# Patient Record
Sex: FEMALE | Race: WHITE | NOT HISPANIC OR LATINO | Employment: OTHER | URBAN - METROPOLITAN AREA
[De-identification: names, ages, dates, MRNs, and addresses within clinical notes are randomized per-mention and may not be internally consistent; named-entity substitution may affect disease eponyms.]

---

## 2019-08-15 ENCOUNTER — OFFICE VISIT (OUTPATIENT)
Dept: ORTHOPEDICS | Facility: CLINIC | Age: 34
End: 2019-08-15
Payer: MEDICARE

## 2019-08-15 VITALS — WEIGHT: 293 LBS | TEMPERATURE: 98 F | HEIGHT: 69 IN | BODY MASS INDEX: 43.4 KG/M2

## 2019-08-15 DIAGNOSIS — M17.12 PRIMARY OSTEOARTHRITIS OF LEFT KNEE: Primary | ICD-10-CM

## 2019-08-15 PROCEDURE — 73560 PR  X-RAY KNEE 1 OR 2 VIEW: ICD-10-PCS | Mod: TC,LT,S$GLB, | Performed by: ORTHOPAEDIC SURGERY

## 2019-08-15 PROCEDURE — 99202 PR OFFICE/OUTPT VISIT, NEW, LEVL II, 15-29 MIN: ICD-10-PCS | Mod: 25,S$GLB,, | Performed by: ORTHOPAEDIC SURGERY

## 2019-08-15 PROCEDURE — 73560 X-RAY EXAM OF KNEE 1 OR 2: CPT | Mod: TC,LT,S$GLB, | Performed by: ORTHOPAEDIC SURGERY

## 2019-08-15 PROCEDURE — 20610 LARGE JOINT ASPIRATION/INJECTION: L KNEE: ICD-10-PCS | Mod: LT,S$GLB,, | Performed by: ORTHOPAEDIC SURGERY

## 2019-08-15 PROCEDURE — 20610 DRAIN/INJ JOINT/BURSA W/O US: CPT | Mod: LT,S$GLB,, | Performed by: ORTHOPAEDIC SURGERY

## 2019-08-15 PROCEDURE — 99202 OFFICE O/P NEW SF 15 MIN: CPT | Mod: 25,S$GLB,, | Performed by: ORTHOPAEDIC SURGERY

## 2019-08-15 RX ORDER — TRIAMCINOLONE ACETONIDE 40 MG/ML
40 INJECTION, SUSPENSION INTRA-ARTICULAR; INTRAMUSCULAR
Status: DISCONTINUED | OUTPATIENT
Start: 2019-08-15 | End: 2019-08-15 | Stop reason: HOSPADM

## 2019-08-15 RX ORDER — PROPRANOLOL HYDROCHLORIDE 40 MG/1
40 TABLET ORAL 3 TIMES DAILY
COMMUNITY
End: 2020-06-17

## 2019-08-15 RX ORDER — ASPIRIN 81 MG/1
81 TABLET ORAL DAILY
COMMUNITY

## 2019-08-15 RX ORDER — CELECOXIB 200 MG/1
200 CAPSULE ORAL 2 TIMES DAILY
COMMUNITY
End: 2020-09-02 | Stop reason: SDUPTHER

## 2019-08-15 RX ORDER — LAMOTRIGINE 25 MG/1
25 TABLET ORAL DAILY
COMMUNITY
End: 2020-06-17

## 2019-08-15 RX ORDER — PANTOPRAZOLE SODIUM 40 MG/1
40 TABLET, DELAYED RELEASE ORAL DAILY
COMMUNITY
End: 2021-11-29 | Stop reason: SDUPTHER

## 2019-08-15 RX ORDER — HYDROCHLOROTHIAZIDE 25 MG/1
25 TABLET ORAL DAILY
COMMUNITY
End: 2021-10-04

## 2019-08-15 RX ORDER — FLUVOXAMINE MALEATE 150 MG/1
150 CAPSULE, EXTENDED RELEASE ORAL NIGHTLY
COMMUNITY
End: 2021-01-05

## 2019-08-15 RX ADMIN — TRIAMCINOLONE ACETONIDE 40 MG: 40 INJECTION, SUSPENSION INTRA-ARTICULAR; INTRAMUSCULAR at 09:08

## 2019-08-15 NOTE — PROGRESS NOTES
Subjective:      Patient ID: Rosa Maria Burkett is a 34 y.o. female.    Chief Complaint: Knee Pain (LT)    HPI 34-year-old lady who comes in with a long history of left knee pain.  She is presently taking Celebrex with minimal relief.  The patient weighs nearly 500 lb    Review of Systems   Constitution: Positive for weight gain. Negative for fever and weight loss.   Cardiovascular: Negative for chest pain and leg swelling.   Musculoskeletal: Positive for arthritis, joint pain, joint swelling and stiffness. Negative for muscle weakness.   Gastrointestinal: Negative for change in bowel habit.   Genitourinary: Negative for bladder incontinence and hematuria.   Neurological: Negative for focal weakness, numbness, paresthesias and sensory change.         Objective:      Her knee exam is somewhat compromised by her obesity.  She has slight valgus alignment on standing.  She is tender with palpation of both the medial and lateral joint lines.  Range of motion is from 0-110 degrees.  She has no pathologic laxity      Ortho/SPM Exam            Assessment:       Encounter Diagnosis   Name Primary?    Primary osteoarthritis of left knee Yes          Plan:       Rosa Maria was seen today for knee pain.    Diagnoses and all orders for this visit:    Primary osteoarthritis of left knee     Outside x-rays are reviewed and they show some loss of the articular cartilage space especially in the lateral compartment.  She has periarticular osteophytes.    Impression is osteoarthritis which is aggravated by her morbid obesity.  The knee is injected today.  Return p.r.n.  She is not a surgical candidate

## 2019-08-15 NOTE — PROCEDURES
Large Joint Aspiration/Injection: L knee  Date/Time: 8/15/2019 9:39 AM  Performed by: Kayode Turner MD  Authorized by: Kayode Turner MD     Consent Done?:  Yes (Verbal)  Indications:  Pain  Timeout: Prior to procedure the correct patient, procedure, and site was verified    Anesthesia  Local anesthesia used  Anesthetic: lidocaine 2% without epinephrine  Anesthetic total: 2mL    Location:  Knee  Site:  L knee  Prep: Patient was prepped and draped in usual sterile fashion    Needle size:  25 G  Approach:  Anteromedial  Medications:  40 mg triamcinolone acetonide 40 mg/mL  Patient tolerance:  Patient tolerated the procedure well with no immediate complications

## 2020-06-17 ENCOUNTER — OFFICE VISIT (OUTPATIENT)
Dept: FAMILY MEDICINE | Facility: CLINIC | Age: 35
End: 2020-06-17
Payer: MEDICARE

## 2020-06-17 VITALS
HEIGHT: 71 IN | OXYGEN SATURATION: 97 % | HEART RATE: 61 BPM | DIASTOLIC BLOOD PRESSURE: 86 MMHG | TEMPERATURE: 98 F | WEIGHT: 293 LBS | SYSTOLIC BLOOD PRESSURE: 134 MMHG | BODY MASS INDEX: 41.02 KG/M2 | RESPIRATION RATE: 20 BRPM

## 2020-06-17 DIAGNOSIS — F41.9 ANXIETY: ICD-10-CM

## 2020-06-17 DIAGNOSIS — F32.9 MAJOR DEPRESSIVE DISORDER, REMISSION STATUS UNSPECIFIED, UNSPECIFIED WHETHER RECURRENT: ICD-10-CM

## 2020-06-17 DIAGNOSIS — F42.9 OBSESSIVE-COMPULSIVE DISORDER, UNSPECIFIED TYPE: ICD-10-CM

## 2020-06-17 DIAGNOSIS — I10 HYPERTENSION, UNSPECIFIED TYPE: Primary | ICD-10-CM

## 2020-06-17 DIAGNOSIS — Z79.899 ON LONG TERM DRUG THERAPY: ICD-10-CM

## 2020-06-17 DIAGNOSIS — R68.82 LOW LIBIDO: ICD-10-CM

## 2020-06-17 PROCEDURE — 99204 PR OFFICE/OUTPT VISIT, NEW, LEVL IV, 45-59 MIN: ICD-10-PCS | Mod: S$GLB,,, | Performed by: STUDENT IN AN ORGANIZED HEALTH CARE EDUCATION/TRAINING PROGRAM

## 2020-06-17 PROCEDURE — 99204 OFFICE O/P NEW MOD 45 MIN: CPT | Mod: S$GLB,,, | Performed by: STUDENT IN AN ORGANIZED HEALTH CARE EDUCATION/TRAINING PROGRAM

## 2020-06-17 RX ORDER — CYCLOBENZAPRINE HCL 10 MG
10 TABLET ORAL
COMMUNITY
Start: 2020-03-30

## 2020-06-17 RX ORDER — BUTALBITAL, ACETAMINOPHEN AND CAFFEINE 50; 325; 40 MG/1; MG/1; MG/1
1 CAPSULE ORAL
COMMUNITY
Start: 2020-06-05

## 2020-06-17 RX ORDER — LAMOTRIGINE 100 MG/1
100 TABLET ORAL 2 TIMES DAILY
COMMUNITY
Start: 2020-05-18

## 2020-06-17 RX ORDER — PROPRANOLOL HYDROCHLORIDE 60 MG/1
60 TABLET ORAL 2 TIMES DAILY
COMMUNITY
End: 2020-09-02 | Stop reason: SDUPTHER

## 2020-06-17 RX ORDER — BUSPIRONE HYDROCHLORIDE 15 MG/1
30 TABLET ORAL 2 TIMES DAILY
COMMUNITY
End: 2021-07-07

## 2020-06-18 NOTE — PROGRESS NOTES
Subjective:      Patient ID: Rosa Maria Burkett is a 35 y.o. female.    Chief Complaint: Establish Care      HPI: 34 yo female with PMH of anxiety, depression, HTN, and OCD presents today to establish care. Previous PCP Dr. Heck in [place]. Pt recently moved into [place] and wanted to have a closer PCP. Reports doing well on current medications. Reports medication compliance. States she follows with neuropsychiatry. They handle psych meds. Reports decreased sex drive. Symptoms have been present for 7 years. States she noticed the decreased interest after giving birth to her son 7 years ago. Denies pain with intercourse or abdominal pain.     Past Medical History:   Diagnosis Date    Acid reflux     Anxiety     Depression     Hypertension     OCD (obsessive compulsive disorder)      Past Surgical History:   Procedure Laterality Date     SECTION       SECTION      TONSILLECTOMY       Family History   Problem Relation Age of Onset    OCD Mother     Hypertension Mother     Depression Mother     OCD Maternal Grandmother     Depression Maternal Grandmother      Social History     Socioeconomic History    Marital status:      Spouse name: Not on file    Number of children: Not on file    Years of education: Not on file    Highest education level: Not on file   Occupational History    Not on file   Social Needs    Financial resource strain: Somewhat hard    Food insecurity     Worry: Sometimes true     Inability: Never true    Transportation needs     Medical: No     Non-medical: No   Tobacco Use    Smoking status: Current Every Day Smoker     Types: Vaping with nicotine, Vaping w/o nicotine    Smokeless tobacco: Never Used   Substance and Sexual Activity    Alcohol use: Not Currently     Frequency: Never     Drinks per session: Patient refused     Binge frequency: Never    Drug use: Never    Sexual activity: Not Currently   Lifestyle    Physical activity     Days per week:  "0 days     Minutes per session: 0 min    Stress: Very much   Relationships    Social connections     Talks on phone: Twice a week     Gets together: Twice a week     Attends Jew service: Not on file     Active member of club or organization: No     Attends meetings of clubs or organizations: Never     Relationship status:    Other Topics Concern    Not on file   Social History Narrative    Not on file     Review of patient's allergies indicates:  No Known Allergies    Review of Systems   Constitutional: Negative for activity change, appetite change, fatigue and unexpected weight change.   HENT: Negative for sinus pain.    Respiratory: Negative for cough and shortness of breath.    Cardiovascular: Negative for chest pain.   Gastrointestinal: Negative for abdominal pain, nausea and vomiting.   Genitourinary: Negative for difficulty urinating, dysuria, frequency and urgency.        Decreased sex drive   Musculoskeletal: Negative for arthralgias and myalgias.   Neurological: Negative for dizziness and headaches.   Psychiatric/Behavioral: The patient is not nervous/anxious.        Objective:       /86 (BP Location: Right arm, Patient Position: Sitting, BP Method: Large (Manual))   Pulse 61   Temp 98.1 °F (36.7 °C) (Temporal)   Resp 20   Ht 5' 11" (1.803 m)   Wt (!) 238.1 kg (525 lb)   SpO2 97%   BMI 73.22 kg/m²   Physical Exam  Vitals signs and nursing note reviewed.   Constitutional:       Appearance: She is well-developed. She is obese.   HENT:      Head: Normocephalic and atraumatic.   Eyes:      Conjunctiva/sclera: Conjunctivae normal.   Neck:      Musculoskeletal: Normal range of motion and neck supple.   Cardiovascular:      Rate and Rhythm: Normal rate and regular rhythm.      Heart sounds: Normal heart sounds.   Pulmonary:      Effort: Pulmonary effort is normal.      Breath sounds: Normal breath sounds.   Abdominal:      Palpations: Abdomen is soft.      Tenderness: There is no " abdominal tenderness.   Musculoskeletal: Normal range of motion.   Skin:     General: Skin is warm and dry.   Neurological:      Mental Status: She is alert and oriented to person, place, and time.   Psychiatric:         Mood and Affect: Mood normal.         Assessment:     1. Hypertension, unspecified type    2. Obsessive-compulsive disorder, unspecified type    3. Anxiety    4. Major depressive disorder, remission status unspecified, unspecified whether recurrent    5. Low libido    6. On long term drug therapy        Plan:   Hypertension, unspecified type  -     Comprehensive metabolic panel    Obsessive-compulsive disorder, unspecified type    Anxiety    Major depressive disorder, remission status unspecified, unspecified whether recurrent    Low libido    On long term drug therapy  -     CBC auto differential  -     Comprehensive metabolic panel  -     Lipid Panel  -     T4, free  -     TSH      BILLY pending.     Labs pending.     Does not need refills on meds at this time.     Pt to continue following with neuropsychiatry.     Weight loss encouraged.     Will review records.     F/U pending labs.   Medication List with Changes/Refills   Current Medications    ASPIRIN (ECOTRIN) 81 MG EC TABLET    Take 81 mg by mouth once daily.    BUSPIRONE (BUSPAR) 15 MG TABLET    Take 30 mg by mouth 2 (two) times daily.    BUTALBITAL-ACETAMINOPHEN-CAFF -40 MG -40 MG CAP    Take 1 capsule by mouth as needed.    CELECOXIB (CELEBREX) 200 MG CAPSULE    Take 200 mg by mouth 2 (two) times daily.    CYCLOBENZAPRINE (FLEXERIL) 10 MG TABLET    Take 10 mg by mouth as needed.    FLUVOXAMINE (LUVOX) 150 MG 24 HR CAPSULE    Take 150 mg by mouth every evening.    HYDROCHLOROTHIAZIDE (HYDRODIURIL) 25 MG TABLET    Take 25 mg by mouth once daily.    LAMOTRIGINE (LAMICTAL) 100 MG TABLET    Take 100 mg by mouth 2 (two) times daily.    PANTOPRAZOLE (PROTONIX) 40 MG TABLET    Take 40 mg by mouth once daily.    PROPRANOLOL (INDERAL) 60  MG TABLET    Take 60 mg by mouth 2 (two) times daily.   Discontinued Medications    LAMOTRIGINE (LAMICTAL) 25 MG TABLET    Take 25 mg by mouth once daily.    PROPRANOLOL (INDERAL) 40 MG TABLET    Take 40 mg by mouth 3 (three) times daily.              Disclaimer: This note may have been prepared using voice recognition software, it may have not been extensively proofed, as such there could be errors within the text such as sound alike errors.

## 2020-06-19 LAB
ABS NRBC COUNT: 0 X 10 3/UL (ref 0–0.01)
ABSOLUTE BASOPHIL: 0.09 X 10 3/UL (ref 0–0.22)
ABSOLUTE EOSINOPHIL: 0.13 X 10 3/UL (ref 0.04–0.54)
ABSOLUTE IMMATURE GRAN: 0.04 X 10 3/UL (ref 0–0.04)
ABSOLUTE LYMPHOCYTE: 3.01 X 10 3/UL (ref 0.86–4.75)
ABSOLUTE MONOCYTE: 0.97 X 10 3/UL (ref 0.22–1.08)
ALBUMIN SERPL-MCNC: 4.1 G/DL (ref 3.5–5.2)
ALBUMIN/GLOB SERPL ELPH: 1.6 {RATIO} (ref 1–2.7)
ALP ISOS SERPL LEV INH-CCNC: 62 U/L (ref 35–105)
ALT (SGPT): 19 U/L (ref 0–33)
ANION GAP SERPL CALC-SCNC: 10 MMOL/L (ref 8–17)
AST SERPL-CCNC: 17 U/L (ref 0–32)
BASOPHILS NFR BLD: 0.9 % (ref 0.2–1.2)
BILIRUBIN, TOTAL: 0.21 MG/DL (ref 0–1.2)
BUN/CREAT SERPL: 21.5 (ref 6–20)
CALCIUM SERPL-MCNC: 9.2 MG/DL (ref 8.6–10.2)
CARBON DIOXIDE, CO2: 28 MMOL/L (ref 22–29)
CHLORIDE: 105 MMOL/L (ref 98–107)
CHOLEST SERPL-MSCNC: 155 MG/DL (ref 100–200)
CREAT SERPL-MCNC: 0.75 MG/DL (ref 0.5–0.9)
EOSINOPHIL NFR BLD: 1.3 % (ref 0.7–7)
GFR ESTIMATION: 88.46
GLOBULIN: 2.5 G/DL (ref 1.5–4.5)
GLUCOSE: 92 MG/DL (ref 74–106)
HCT VFR BLD AUTO: 37.7 % (ref 37–47)
HDLC SERPL-MCNC: 52 MG/DL
HGB BLD-MCNC: 11.2 G/DL (ref 12–16)
IMMATURE GRANULOCYTES: 0.4 % (ref 0–0.5)
LDL/HDL RATIO: 1.6 (ref 1–3)
LDLC SERPL CALC-MCNC: 82.6 MG/DL (ref 0–100)
LYMPHOCYTES NFR BLD: 29.1 % (ref 19.3–53.1)
MCH RBC QN AUTO: 23.5 PG (ref 27–32)
MCHC RBC AUTO-ENTMCNC: 29.7 G/DL (ref 32–36)
MCV RBC AUTO: 79 FL (ref 82–100)
MONOCYTES NFR BLD: 9.4 % (ref 4.7–12.5)
NEUTROPHILS ABSOLUTE COUNT: 6.1 X 10 3/UL (ref 2.15–7.56)
NEUTROPHILS NFR BLD: 58.9 %
NUCLEATED RED BLOOD CELLS: 0 /100 WBC (ref 0–0.2)
PLATELET # BLD AUTO: 374 X 10 3/UL (ref 135–400)
POTASSIUM: 4.5 MMOL/L (ref 3.5–5.1)
PROT SNV-MCNC: 6.6 G/DL (ref 6.4–8.3)
RBC # BLD AUTO: 4.77 X 10 6/UL (ref 4.2–5.4)
RDW-SD: 48.1 FL (ref 37–54)
SODIUM: 143 MMOL/L (ref 136–145)
T4, FREE: 0.85 NG/DL (ref 0.93–1.7)
TRIGL SERPL-MCNC: 102 MG/DL (ref 0–150)
TSH SERPL DL<=0.005 MIU/L-ACNC: 0.51 UIU/ML (ref 0.27–4.2)
UREA NITROGEN (BUN): 16.1 MG/DL (ref 6–20)
WBC # BLD: 10.34 X 10 3/UL (ref 4.3–10.8)

## 2020-07-02 PROBLEM — E04.1 THYROID NODULE: Status: ACTIVE | Noted: 2020-07-02

## 2020-07-02 PROBLEM — F41.9 ANXIETY: Status: ACTIVE | Noted: 2020-07-02

## 2020-07-02 PROBLEM — F32.A DEPRESSION: Status: ACTIVE | Noted: 2020-07-02

## 2020-07-02 PROBLEM — I10 HTN (HYPERTENSION): Status: ACTIVE | Noted: 2020-07-02

## 2020-07-02 PROBLEM — F42.9 OCD (OBSESSIVE COMPULSIVE DISORDER): Status: ACTIVE | Noted: 2020-07-02

## 2020-07-29 ENCOUNTER — OFFICE VISIT (OUTPATIENT)
Dept: FAMILY MEDICINE | Facility: CLINIC | Age: 35
End: 2020-07-29
Payer: MEDICARE

## 2020-07-29 VITALS
HEIGHT: 71 IN | BODY MASS INDEX: 41.02 KG/M2 | RESPIRATION RATE: 18 BRPM | SYSTOLIC BLOOD PRESSURE: 158 MMHG | DIASTOLIC BLOOD PRESSURE: 76 MMHG | WEIGHT: 293 LBS | TEMPERATURE: 98 F | HEART RATE: 79 BPM | OXYGEN SATURATION: 97 %

## 2020-07-29 DIAGNOSIS — N63.24 UNSPECIFIED LUMP IN THE LEFT BREAST, LOWER INNER QUADRANT: Primary | ICD-10-CM

## 2020-07-29 DIAGNOSIS — F41.9 ANXIETY: ICD-10-CM

## 2020-07-29 DIAGNOSIS — N92.0 MENORRHAGIA WITH REGULAR CYCLE: ICD-10-CM

## 2020-07-29 DIAGNOSIS — I10 HYPERTENSION, UNSPECIFIED TYPE: ICD-10-CM

## 2020-07-29 DIAGNOSIS — Z12.4 SCREENING FOR MALIGNANT NEOPLASM OF CERVIX: ICD-10-CM

## 2020-07-29 PROCEDURE — 99214 OFFICE O/P EST MOD 30 MIN: CPT | Mod: S$GLB,,, | Performed by: STUDENT IN AN ORGANIZED HEALTH CARE EDUCATION/TRAINING PROGRAM

## 2020-07-29 PROCEDURE — 99214 PR OFFICE/OUTPT VISIT, EST, LEVL IV, 30-39 MIN: ICD-10-PCS | Mod: S$GLB,,, | Performed by: STUDENT IN AN ORGANIZED HEALTH CARE EDUCATION/TRAINING PROGRAM

## 2020-07-29 NOTE — PROGRESS NOTES
Subjective:      Patient ID: Rosa Maria Burkett is a 35 y.o. female.    Chief Complaint: Follow-up (6 weeks f/u)      HPI:  35-year-old female with past medical history of anxiety, depression, OCD, hypertension, and acid reflux presents today for multiple complaints.  Patient reports finding a breast lump in the left breast.  Reports finding the lump approximately 1 week ago.  Denies tenderness, redness, or nipple discharge.  Patient states she is concerned because her mother was diagnosed with breast cancer at a young age.  And her brother was recently diagnosed with cancer.  Patient also reports heavy menstrual cycles.  States the symptoms have occurred for several years.  Worsened after giving birth to her son 7 years ago.  Reports regular cycles.  Wishes to get established with gyn.  Patient is currently following with neuropsych for OCD, anxiety, and depression.  Does not feel like her anxiety is being treated effectively.  Denies any improvement in anxiety with these BuSpar.  States Valium is what helped her in the past.  BP elevated in clinic.  Patient reports forgetting to take her BP med this morning.    Past Medical History:   Diagnosis Date    Acid reflux     Anxiety     Depression     Hypertension     OCD (obsessive compulsive disorder)      Past Surgical History:   Procedure Laterality Date     SECTION       SECTION      TONSILLECTOMY       Family History   Problem Relation Age of Onset    OCD Mother     Hypertension Mother     Depression Mother     OCD Maternal Grandmother     Depression Maternal Grandmother      Social History     Socioeconomic History    Marital status:      Spouse name: Not on file    Number of children: Not on file    Years of education: Not on file    Highest education level: Not on file   Occupational History    Not on file   Social Needs    Financial resource strain: Somewhat hard    Food insecurity     Worry: Sometimes true     Inability:  "Never true    Transportation needs     Medical: No     Non-medical: No   Tobacco Use    Smoking status: Current Every Day Smoker     Types: Vaping with nicotine, Vaping w/o nicotine    Smokeless tobacco: Never Used   Substance and Sexual Activity    Alcohol use: Not Currently     Frequency: Never     Drinks per session: Patient refused     Binge frequency: Never    Drug use: Never    Sexual activity: Not Currently   Lifestyle    Physical activity     Days per week: 0 days     Minutes per session: 0 min    Stress: Very much   Relationships    Social connections     Talks on phone: Twice a week     Gets together: Twice a week     Attends Caodaism service: Not on file     Active member of club or organization: No     Attends meetings of clubs or organizations: Never     Relationship status:    Other Topics Concern    Not on file   Social History Narrative    Not on file     Review of patient's allergies indicates:  No Known Allergies    Review of Systems   Constitutional: Negative for activity change, appetite change, fatigue and unexpected weight change.   HENT: Negative for sinus pain.    Respiratory: Negative for cough and shortness of breath.    Cardiovascular: Negative for chest pain.   Gastrointestinal: Negative for abdominal pain, nausea and vomiting.   Genitourinary: Positive for menstrual problem. Negative for difficulty urinating.        Breast lump, left   Musculoskeletal: Negative for arthralgias and myalgias.   Neurological: Negative for dizziness and headaches.   Psychiatric/Behavioral: The patient is nervous/anxious.        Objective:       BP (!) 158/76 (BP Location: Right arm, Patient Position: Sitting, BP Method: Large (Manual))   Pulse 79   Temp 97.5 °F (36.4 °C) (Temporal)   Resp 18   Ht 5' 11" (1.803 m)   Wt (!) 240.9 kg (531 lb)   SpO2 97%   BMI 74.06 kg/m²   Physical Exam  Constitutional:       Appearance: She is well-developed. She is obese.   HENT:      Head: " Normocephalic and atraumatic.   Eyes:      Extraocular Movements: Extraocular movements intact.      Conjunctiva/sclera: Conjunctivae normal.      Pupils: Pupils are equal, round, and reactive to light.   Neck:      Musculoskeletal: Normal range of motion and neck supple.   Cardiovascular:      Rate and Rhythm: Normal rate and regular rhythm.   Pulmonary:      Effort: Pulmonary effort is normal.   Chest:      Breasts:         Right: Normal.         Left: Normal. No mass, skin change or tenderness.   Abdominal:      Palpations: Abdomen is soft.   Musculoskeletal: Normal range of motion.   Lymphadenopathy:      Upper Body:      Right upper body: No axillary adenopathy.      Left upper body: No axillary adenopathy.   Skin:     General: Skin is warm and dry.   Neurological:      Mental Status: She is alert and oriented to person, place, and time.         Assessment:     1. Unspecified lump in the left breast, lower inner quadrant     2. Menorrhagia with regular cycle    3. Anxiety    4. Hypertension, unspecified type    5. Screening for malignant neoplasm of cervix        Plan:   Unspecified lump in the left breast, lower inner quadrant   -     Mammo Digital Diagnostic Left; Future; Expected date: 07/29/2020    Menorrhagia with regular cycle  -     Ambulatory referral/consult to Gynecology; Future; Expected date: 08/05/2020    Anxiety    Hypertension, unspecified type    Screening for malignant neoplasm of cervix  -     Ambulatory referral/consult to Gynecology; Future; Expected date: 08/05/2020      Mammogram pending.    Referral to Gyn pending.    Patient to follow-up with therapist and psychiatrist.    Continue current BP meds.  Monitor BP at home.  Keep log to review at follow-up.    Low-salt diet exercise recommended.  Medication List with Changes/Refills   Current Medications    ASPIRIN (ECOTRIN) 81 MG EC TABLET    Take 81 mg by mouth once daily.    BUSPIRONE (BUSPAR) 15 MG TABLET    Take 30 mg by mouth 2 (two)  times daily.    BUTALBITAL-ACETAMINOPHEN-CAFF -40 MG -40 MG CAP    Take 1 capsule by mouth as needed.    CELECOXIB (CELEBREX) 200 MG CAPSULE    Take 200 mg by mouth 2 (two) times daily.    CYCLOBENZAPRINE (FLEXERIL) 10 MG TABLET    Take 10 mg by mouth as needed.    FLUVOXAMINE (LUVOX) 150 MG 24 HR CAPSULE    Take 150 mg by mouth every evening.    HYDROCHLOROTHIAZIDE (HYDRODIURIL) 25 MG TABLET    Take 25 mg by mouth once daily.    LAMOTRIGINE (LAMICTAL) 100 MG TABLET    Take 100 mg by mouth 2 (two) times daily.    PANTOPRAZOLE (PROTONIX) 40 MG TABLET    Take 40 mg by mouth once daily.    PROPRANOLOL (INDERAL) 60 MG TABLET    Take 60 mg by mouth 2 (two) times daily.              Disclaimer: This note may have been prepared using voice recognition software, it may have not been extensively proofed, as such there could be errors within the text such as sound alike errors.

## 2020-09-02 RX ORDER — PROPRANOLOL HYDROCHLORIDE 60 MG/1
60 TABLET ORAL 2 TIMES DAILY
Qty: 60 TABLET | Refills: 3 | Status: SHIPPED | OUTPATIENT
Start: 2020-09-02 | End: 2020-12-28

## 2020-09-02 RX ORDER — CELECOXIB 200 MG/1
200 CAPSULE ORAL 2 TIMES DAILY
Qty: 60 CAPSULE | Refills: 3 | Status: SHIPPED | OUTPATIENT
Start: 2020-09-02 | End: 2021-01-26

## 2020-09-02 RX ORDER — FLUCONAZOLE 150 MG/1
150 TABLET ORAL DAILY
Qty: 7 TABLET | Refills: 0 | Status: SHIPPED | OUTPATIENT
Start: 2020-09-02 | End: 2020-09-09

## 2020-11-11 ENCOUNTER — OFFICE VISIT (OUTPATIENT)
Dept: FAMILY MEDICINE | Facility: CLINIC | Age: 35
End: 2020-11-11
Payer: MEDICARE

## 2020-11-11 VITALS
DIASTOLIC BLOOD PRESSURE: 88 MMHG | BODY MASS INDEX: 41.02 KG/M2 | RESPIRATION RATE: 18 BRPM | SYSTOLIC BLOOD PRESSURE: 140 MMHG | HEIGHT: 71 IN | TEMPERATURE: 99 F | HEART RATE: 74 BPM | WEIGHT: 293 LBS | OXYGEN SATURATION: 97 %

## 2020-11-11 DIAGNOSIS — G47.19 EXCESSIVE DAYTIME SLEEPINESS: Primary | ICD-10-CM

## 2020-11-11 DIAGNOSIS — M17.0 OSTEOARTHRITIS OF BOTH KNEES, UNSPECIFIED OSTEOARTHRITIS TYPE: ICD-10-CM

## 2020-11-11 DIAGNOSIS — J30.2 SEASONAL ALLERGIC RHINITIS, UNSPECIFIED TRIGGER: ICD-10-CM

## 2020-11-11 DIAGNOSIS — Z23 IMMUNIZATION DUE: ICD-10-CM

## 2020-11-11 DIAGNOSIS — I10 HYPERTENSION, UNSPECIFIED TYPE: ICD-10-CM

## 2020-11-11 PROCEDURE — 99214 PR OFFICE/OUTPT VISIT, EST, LEVL IV, 30-39 MIN: ICD-10-PCS | Mod: 25,S$GLB,, | Performed by: STUDENT IN AN ORGANIZED HEALTH CARE EDUCATION/TRAINING PROGRAM

## 2020-11-11 PROCEDURE — 99214 OFFICE O/P EST MOD 30 MIN: CPT | Mod: 25,S$GLB,, | Performed by: STUDENT IN AN ORGANIZED HEALTH CARE EDUCATION/TRAINING PROGRAM

## 2020-11-11 PROCEDURE — 90686 IIV4 VACC NO PRSV 0.5 ML IM: CPT | Mod: S$GLB,,, | Performed by: STUDENT IN AN ORGANIZED HEALTH CARE EDUCATION/TRAINING PROGRAM

## 2020-11-11 PROCEDURE — G0008 ADMIN INFLUENZA VIRUS VAC: HCPCS | Mod: S$GLB,,, | Performed by: STUDENT IN AN ORGANIZED HEALTH CARE EDUCATION/TRAINING PROGRAM

## 2020-11-11 PROCEDURE — 90686 FLU VACCINE (QUAD) GREATER THAN OR EQUAL TO 3YO PRESERVATIVE FREE IM: ICD-10-PCS | Mod: S$GLB,,, | Performed by: STUDENT IN AN ORGANIZED HEALTH CARE EDUCATION/TRAINING PROGRAM

## 2020-11-11 PROCEDURE — G0008 FLU VACCINE (QUAD) GREATER THAN OR EQUAL TO 3YO PRESERVATIVE FREE IM: ICD-10-PCS | Mod: S$GLB,,, | Performed by: STUDENT IN AN ORGANIZED HEALTH CARE EDUCATION/TRAINING PROGRAM

## 2020-11-11 RX ORDER — LEVOCETIRIZINE DIHYDROCHLORIDE 5 MG/1
5 TABLET, FILM COATED ORAL NIGHTLY
Qty: 30 TABLET | Refills: 5 | Status: SHIPPED | OUTPATIENT
Start: 2020-11-11 | End: 2021-05-31 | Stop reason: SDUPTHER
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
## 2020-11-15 ENCOUNTER — PATIENT MESSAGE (OUTPATIENT)
Dept: FAMILY MEDICINE | Facility: CLINIC | Age: 35
End: 2020-11-15

## 2020-11-24 ENCOUNTER — TELEPHONE (OUTPATIENT)
Dept: FAMILY MEDICINE | Facility: CLINIC | Age: 35
End: 2020-11-24

## 2020-11-24 NOTE — TELEPHONE ENCOUNTER
----- Message from Wendi Gillette PA-C sent at 11/24/2020  9:22 AM CST -----  Typically we don't send prescriptions for BP cuffs. Insurance will not cover them.   ----- Message -----  From: Dolores Sousa LPN  Sent: 11/24/2020   8:41 AM CST  To: Wendi Gillette PA-C    Patient would like a script for a blood pressure cuff sent to her pharmacy so she can monitor her blood pressure.

## 2021-01-05 ENCOUNTER — OFFICE VISIT (OUTPATIENT)
Dept: FAMILY MEDICINE | Facility: CLINIC | Age: 36
End: 2021-01-05
Payer: MEDICARE

## 2021-01-05 VITALS
WEIGHT: 293 LBS | HEIGHT: 71 IN | OXYGEN SATURATION: 98 % | SYSTOLIC BLOOD PRESSURE: 130 MMHG | HEART RATE: 78 BPM | TEMPERATURE: 99 F | DIASTOLIC BLOOD PRESSURE: 78 MMHG | RESPIRATION RATE: 18 BRPM | BODY MASS INDEX: 41.02 KG/M2

## 2021-01-05 DIAGNOSIS — M72.2 PLANTAR FASCIITIS OF LEFT FOOT: Primary | ICD-10-CM

## 2021-01-05 DIAGNOSIS — I10 HYPERTENSION, UNSPECIFIED TYPE: ICD-10-CM

## 2021-01-05 DIAGNOSIS — B35.4 TINEA CORPORIS: ICD-10-CM

## 2021-01-05 DIAGNOSIS — L84 PLANTAR CALLUS: ICD-10-CM

## 2021-01-05 PROCEDURE — 99214 PR OFFICE/OUTPT VISIT, EST, LEVL IV, 30-39 MIN: ICD-10-PCS | Mod: S$GLB,,, | Performed by: STUDENT IN AN ORGANIZED HEALTH CARE EDUCATION/TRAINING PROGRAM

## 2021-01-05 PROCEDURE — 99214 OFFICE O/P EST MOD 30 MIN: CPT | Mod: S$GLB,,, | Performed by: STUDENT IN AN ORGANIZED HEALTH CARE EDUCATION/TRAINING PROGRAM

## 2021-01-05 RX ORDER — CLOTRIMAZOLE 1 %
CREAM (GRAM) TOPICAL 2 TIMES DAILY
Qty: 12 G | Refills: 0 | Status: SHIPPED | OUTPATIENT
Start: 2021-01-05

## 2021-01-05 RX ORDER — GABAPENTIN 600 MG/1
TABLET ORAL
COMMUNITY
Start: 2020-11-25 | End: 2021-02-11

## 2021-01-07 DIAGNOSIS — D64.9 ANEMIA, UNSPECIFIED TYPE: Primary | ICD-10-CM

## 2021-01-07 LAB
%TRANSFERRIN SATURATION: 11.4 % (ref 20–50)
ABS NRBC COUNT: 0 X 10 3/UL (ref 0–0.01)
ABSOLUTE BASOPHIL: 0.06 X 10 3/UL (ref 0–0.22)
ABSOLUTE EOSINOPHIL: 0.13 X 10 3/UL (ref 0.04–0.54)
ABSOLUTE IMMATURE GRAN: 0.04 X 10 3/UL (ref 0–0.04)
ABSOLUTE LYMPHOCYTE: 2.77 X 10 3/UL (ref 0.86–4.75)
ABSOLUTE MONOCYTE: 0.83 X 10 3/UL (ref 0.22–1.08)
ALBUMIN SERPL-MCNC: 3.9 G/DL (ref 3.5–5.2)
ALBUMIN/GLOB SERPL ELPH: 1.5 {RATIO} (ref 1–2.7)
ALP ISOS SERPL LEV INH-CCNC: 67 U/L (ref 35–105)
ALT (SGPT): 15 U/L (ref 0–33)
ANION GAP SERPL CALC-SCNC: 10 MMOL/L (ref 8–17)
AST SERPL-CCNC: 16 U/L (ref 0–32)
BASOPHILS NFR BLD: 0.6 % (ref 0.2–1.2)
BILIRUBIN, TOTAL: 0.23 MG/DL (ref 0–1.2)
BUN/CREAT SERPL: 19.6 (ref 6–20)
CALCIUM SERPL-MCNC: 9.1 MG/DL (ref 8.6–10.2)
CARBON DIOXIDE, CO2: 30 MMOL/L (ref 22–29)
CHLORIDE: 104 MMOL/L (ref 98–107)
CREAT SERPL-MCNC: 0.79 MG/DL (ref 0.5–0.9)
EOSINOPHIL NFR BLD: 1.4 % (ref 0.7–7)
FERRITIN: 13.4 NG/ML (ref 10–154)
GFR ESTIMATION: 83.31
GLOBULIN: 2.6 G/DL (ref 1.5–4.5)
GLUCOSE: 93 MG/DL (ref 74–106)
HCT VFR BLD AUTO: 36 % (ref 37–47)
HGB BLD-MCNC: 10.5 G/DL (ref 12–16)
IMMATURE GRANULOCYTES: 0.4 % (ref 0–0.5)
IRON BINDING CAPACITY: 351 UG/DL (ref 262–472)
IRON SERPL-MCNC: 40 UG/DL (ref 37–145)
LYMPHOCYTES NFR BLD: 29.1 % (ref 19.3–53.1)
MCH RBC QN AUTO: 22.7 PG (ref 27–32)
MCHC RBC AUTO-ENTMCNC: 29.2 G/DL (ref 32–36)
MCV RBC AUTO: 77.9 FL (ref 82–100)
MONOCYTES NFR BLD: 8.7 % (ref 4.7–12.5)
NEUTROPHILS # BLD AUTO: 5.7 X 10 3/UL (ref 2.15–7.56)
NEUTROPHILS NFR BLD: 59.8 %
NUCLEATED RED BLOOD CELLS: 0 /100 WBC (ref 0–0.2)
PLATELET # BLD AUTO: 355 X 10 3/UL (ref 135–400)
POTASSIUM: 4.3 MMOL/L (ref 3.5–5.1)
PROT SNV-MCNC: 6.5 G/DL (ref 6.4–8.3)
RBC # BLD AUTO: 4.62 X 10 6/UL (ref 4.2–5.4)
RDW-SD: 50.3 FL (ref 37–54)
SODIUM: 144 MMOL/L (ref 136–145)
UIBC SERPL-MCNC: 311 UG/DL (ref 112–306)
UREA NITROGEN (BUN): 15.5 MG/DL (ref 6–20)
WBC # BLD: 9.53 X 10 3/UL (ref 4.3–10.8)

## 2021-01-18 ENCOUNTER — PATIENT MESSAGE (OUTPATIENT)
Dept: FAMILY MEDICINE | Facility: CLINIC | Age: 36
End: 2021-01-18

## 2021-02-11 ENCOUNTER — OFFICE VISIT (OUTPATIENT)
Dept: FAMILY MEDICINE | Facility: CLINIC | Age: 36
End: 2021-02-11
Payer: MEDICARE

## 2021-02-11 VITALS
SYSTOLIC BLOOD PRESSURE: 148 MMHG | TEMPERATURE: 98 F | HEART RATE: 72 BPM | DIASTOLIC BLOOD PRESSURE: 72 MMHG | HEIGHT: 71 IN | WEIGHT: 293 LBS | BODY MASS INDEX: 41.02 KG/M2 | OXYGEN SATURATION: 96 % | RESPIRATION RATE: 20 BRPM

## 2021-02-11 DIAGNOSIS — I10 HYPERTENSION, UNSPECIFIED TYPE: ICD-10-CM

## 2021-02-11 DIAGNOSIS — M17.12 OSTEOARTHRITIS OF LEFT KNEE, UNSPECIFIED OSTEOARTHRITIS TYPE: Primary | ICD-10-CM

## 2021-02-11 PROCEDURE — 99213 OFFICE O/P EST LOW 20 MIN: CPT | Mod: 25,S$GLB,, | Performed by: STUDENT IN AN ORGANIZED HEALTH CARE EDUCATION/TRAINING PROGRAM

## 2021-02-11 PROCEDURE — 99213 PR OFFICE/OUTPT VISIT, EST, LEVL III, 20-29 MIN: ICD-10-PCS | Mod: 25,S$GLB,, | Performed by: STUDENT IN AN ORGANIZED HEALTH CARE EDUCATION/TRAINING PROGRAM

## 2021-02-11 PROCEDURE — 20610 LARGE JOINT ASPIRATION/INJECTION: L KNEE: ICD-10-PCS | Mod: LT,S$GLB,, | Performed by: STUDENT IN AN ORGANIZED HEALTH CARE EDUCATION/TRAINING PROGRAM

## 2021-02-11 PROCEDURE — 20610 DRAIN/INJ JOINT/BURSA W/O US: CPT | Mod: LT,S$GLB,, | Performed by: STUDENT IN AN ORGANIZED HEALTH CARE EDUCATION/TRAINING PROGRAM

## 2021-02-11 RX ORDER — TRIAMCINOLONE ACETONIDE 40 MG/ML
40 INJECTION, SUSPENSION INTRA-ARTICULAR; INTRAMUSCULAR
Status: DISCONTINUED | OUTPATIENT
Start: 2021-02-11 | End: 2021-02-11 | Stop reason: HOSPADM

## 2021-02-11 RX ORDER — DIAZEPAM 5 MG/1
5 TABLET ORAL DAILY
COMMUNITY
Start: 2021-01-18

## 2021-02-11 RX ORDER — GABAPENTIN 800 MG/1
800 TABLET ORAL 3 TIMES DAILY
COMMUNITY
Start: 2021-01-18

## 2021-02-11 RX ORDER — DICLOFENAC SODIUM 10 MG/G
1 GEL TOPICAL DAILY
COMMUNITY
Start: 2021-01-20

## 2021-02-11 RX ADMIN — TRIAMCINOLONE ACETONIDE 40 MG: 40 INJECTION, SUSPENSION INTRA-ARTICULAR; INTRAMUSCULAR at 03:02

## 2021-03-09 ENCOUNTER — OFFICE VISIT (OUTPATIENT)
Dept: FAMILY MEDICINE | Facility: CLINIC | Age: 36
End: 2021-03-09
Payer: MEDICARE

## 2021-03-09 VITALS
TEMPERATURE: 98 F | WEIGHT: 293 LBS | HEIGHT: 71 IN | HEART RATE: 68 BPM | DIASTOLIC BLOOD PRESSURE: 86 MMHG | SYSTOLIC BLOOD PRESSURE: 122 MMHG | BODY MASS INDEX: 41.02 KG/M2 | RESPIRATION RATE: 20 BRPM | OXYGEN SATURATION: 97 %

## 2021-03-09 DIAGNOSIS — B37.9 YEAST INFECTION: ICD-10-CM

## 2021-03-09 DIAGNOSIS — Z79.899 ON LONG TERM DRUG THERAPY: ICD-10-CM

## 2021-03-09 DIAGNOSIS — M17.12 OSTEOARTHRITIS OF LEFT KNEE, UNSPECIFIED OSTEOARTHRITIS TYPE: Primary | ICD-10-CM

## 2021-03-09 DIAGNOSIS — D64.9 ANEMIA, UNSPECIFIED TYPE: ICD-10-CM

## 2021-03-09 PROCEDURE — 99214 PR OFFICE/OUTPT VISIT, EST, LEVL IV, 30-39 MIN: ICD-10-PCS | Mod: S$GLB,,, | Performed by: STUDENT IN AN ORGANIZED HEALTH CARE EDUCATION/TRAINING PROGRAM

## 2021-03-09 PROCEDURE — 99214 OFFICE O/P EST MOD 30 MIN: CPT | Mod: S$GLB,,, | Performed by: STUDENT IN AN ORGANIZED HEALTH CARE EDUCATION/TRAINING PROGRAM

## 2021-03-09 RX ORDER — ACETAMINOPHEN AND CODEINE PHOSPHATE 300; 30 MG/1; MG/1
1 TABLET ORAL EVERY 6 HOURS PRN
Qty: 12 TABLET | Refills: 0 | Status: SHIPPED | OUTPATIENT
Start: 2021-03-09 | End: 2021-03-12

## 2021-03-09 RX ORDER — FLUCONAZOLE 150 MG/1
150 TABLET ORAL DAILY
Qty: 1 TABLET | Refills: 1 | Status: SHIPPED | OUTPATIENT
Start: 2021-03-09 | End: 2021-03-10

## 2021-03-11 LAB
ABS NRBC COUNT: 0 X 10 3/UL (ref 0–0.01)
ABSOLUTE BASOPHIL: 0.11 X 10 3/UL (ref 0–0.22)
ABSOLUTE EOSINOPHIL: 0.19 X 10 3/UL (ref 0.04–0.54)
ABSOLUTE IMMATURE GRAN: 0.03 X 10 3/UL (ref 0–0.04)
ABSOLUTE LYMPHOCYTE: 2.99 X 10 3/UL (ref 0.86–4.75)
ABSOLUTE MONOCYTE: 0.83 X 10 3/UL (ref 0.22–1.08)
ALBUMIN SERPL-MCNC: 3.7 G/DL (ref 3.5–5.2)
ALBUMIN/GLOB SERPL ELPH: 1.4 {RATIO} (ref 1–2.7)
ALP ISOS SERPL LEV INH-CCNC: 64 U/L (ref 35–105)
ALT (SGPT): 15 U/L (ref 0–33)
ANION GAP SERPL CALC-SCNC: 7 MMOL/L (ref 8–17)
AST SERPL-CCNC: 13 U/L (ref 0–32)
BASOPHILS NFR BLD: 1.1 % (ref 0.2–1.2)
BILIRUBIN, TOTAL: 0.18 MG/DL (ref 0–1.2)
BUN/CREAT SERPL: 21.1 (ref 6–20)
CALCIUM SERPL-MCNC: 8.9 MG/DL (ref 8.6–10.2)
CARBON DIOXIDE, CO2: 31 MMOL/L (ref 22–29)
CHLORIDE: 104 MMOL/L (ref 98–107)
CREAT SERPL-MCNC: 0.76 MG/DL (ref 0.5–0.9)
EOSINOPHIL NFR BLD: 1.9 % (ref 0.7–7)
GFR ESTIMATION: 86.6
GLOBULIN: 2.7 G/DL (ref 1.5–4.5)
GLUCOSE: 90 MG/DL (ref 74–106)
HCT VFR BLD AUTO: 36.4 % (ref 37–47)
HGB BLD-MCNC: 10.6 G/DL (ref 12–16)
IMMATURE GRANULOCYTES: 0.3 % (ref 0–0.5)
LYMPHOCYTES NFR BLD: 29.8 % (ref 19.3–53.1)
MCH RBC QN AUTO: 22.5 PG (ref 27–32)
MCHC RBC AUTO-ENTMCNC: 29.1 G/DL (ref 32–36)
MCV RBC AUTO: 77.3 FL (ref 82–100)
MONOCYTES NFR BLD: 8.3 % (ref 4.7–12.5)
NEUTROPHILS # BLD AUTO: 5.87 X 10 3/UL (ref 2.15–7.56)
NEUTROPHILS NFR BLD: 58.6 %
NUCLEATED RED BLOOD CELLS: 0 /100 WBC (ref 0–0.2)
PLATELET # BLD AUTO: 336 X 10 3/UL (ref 135–400)
POTASSIUM: 5.7 MMOL/L (ref 3.5–5.1)
PROT SNV-MCNC: 6.4 G/DL (ref 6.4–8.3)
RBC # BLD AUTO: 4.71 X 10 6/UL (ref 4.2–5.4)
RDW-SD: 51.1 FL (ref 37–54)
SODIUM: 142 MMOL/L (ref 136–145)
UREA NITROGEN (BUN): 16 MG/DL (ref 6–20)
WBC # BLD: 10.02 X 10 3/UL (ref 4.3–10.8)

## 2021-03-12 DIAGNOSIS — E87.5 HYPERKALEMIA: Primary | ICD-10-CM

## 2021-05-31 DIAGNOSIS — J30.2 SEASONAL ALLERGIC RHINITIS, UNSPECIFIED TRIGGER: ICD-10-CM

## 2021-05-31 RX ORDER — LEVOCETIRIZINE DIHYDROCHLORIDE 5 MG/1
5 TABLET, FILM COATED ORAL NIGHTLY
Qty: 30 TABLET | Refills: 5 | Status: SHIPPED | OUTPATIENT
Start: 2021-05-31 | End: 2021-11-29 | Stop reason: SDUPTHER

## 2021-06-07 RX ORDER — CELECOXIB 200 MG/1
200 CAPSULE ORAL 2 TIMES DAILY
Qty: 60 CAPSULE | Refills: 1 | Status: SHIPPED | OUTPATIENT
Start: 2021-06-07 | End: 2021-08-23 | Stop reason: SDUPTHER

## 2021-07-07 ENCOUNTER — OFFICE VISIT (OUTPATIENT)
Dept: FAMILY MEDICINE | Facility: CLINIC | Age: 36
End: 2021-07-07
Payer: MEDICARE

## 2021-07-07 VITALS
WEIGHT: 293 LBS | OXYGEN SATURATION: 95 % | BODY MASS INDEX: 41.02 KG/M2 | HEIGHT: 71 IN | HEART RATE: 61 BPM | DIASTOLIC BLOOD PRESSURE: 92 MMHG | SYSTOLIC BLOOD PRESSURE: 136 MMHG | TEMPERATURE: 98 F

## 2021-07-07 DIAGNOSIS — Z11.4 SCREENING FOR HIV (HUMAN IMMUNODEFICIENCY VIRUS): ICD-10-CM

## 2021-07-07 DIAGNOSIS — Z79.899 ON LONG TERM DRUG THERAPY: ICD-10-CM

## 2021-07-07 DIAGNOSIS — I10 HYPERTENSION, UNSPECIFIED TYPE: Primary | ICD-10-CM

## 2021-07-07 DIAGNOSIS — D64.9 ANEMIA, UNSPECIFIED TYPE: ICD-10-CM

## 2021-07-07 PROCEDURE — 99214 OFFICE O/P EST MOD 30 MIN: CPT | Mod: S$GLB,,, | Performed by: STUDENT IN AN ORGANIZED HEALTH CARE EDUCATION/TRAINING PROGRAM

## 2021-07-07 PROCEDURE — 99214 PR OFFICE/OUTPT VISIT, EST, LEVL IV, 30-39 MIN: ICD-10-PCS | Mod: S$GLB,,, | Performed by: STUDENT IN AN ORGANIZED HEALTH CARE EDUCATION/TRAINING PROGRAM

## 2021-07-07 RX ORDER — LAMOTRIGINE 150 MG/1
150 TABLET ORAL DAILY
COMMUNITY
Start: 2021-07-01

## 2021-07-07 RX ORDER — ARIPIPRAZOLE 5 MG/1
5 TABLET ORAL DAILY
COMMUNITY

## 2021-07-07 RX ORDER — METOPROLOL TARTRATE 25 MG/1
25 TABLET, FILM COATED ORAL 2 TIMES DAILY
Qty: 60 TABLET | Refills: 1 | Status: SHIPPED | OUTPATIENT
Start: 2021-07-07 | End: 2021-11-29 | Stop reason: SDUPTHER

## 2021-07-07 RX ORDER — ARIPIPRAZOLE 10 MG/1
10 TABLET ORAL DAILY
COMMUNITY
Start: 2021-07-01

## 2021-07-07 RX ORDER — FLUVOXAMINE MALEATE 150 MG/1
300 CAPSULE, EXTENDED RELEASE ORAL EVERY MORNING
COMMUNITY
Start: 2021-06-27

## 2021-07-09 LAB
ABS NRBC COUNT: 0 X 10 3/UL (ref 0–0.01)
ABSOLUTE BASOPHIL: 0.09 X 10 3/UL (ref 0–0.22)
ABSOLUTE EOSINOPHIL: 0.15 X 10 3/UL (ref 0.04–0.54)
ABSOLUTE IMMATURE GRAN: 0.06 X 10 3/UL (ref 0–0.04)
ABSOLUTE LYMPHOCYTE: 3.25 X 10 3/UL (ref 0.86–4.75)
ABSOLUTE MONOCYTE: 0.9 X 10 3/UL (ref 0.22–1.08)
ALBUMIN SERPL-MCNC: 3.9 G/DL (ref 3.5–5.2)
ALBUMIN/GLOB SERPL ELPH: 1.4 {RATIO} (ref 1–2.7)
ALP ISOS SERPL LEV INH-CCNC: 69 U/L (ref 35–105)
ALT (SGPT): 15 U/L (ref 0–33)
ANION GAP SERPL CALC-SCNC: 9 MMOL/L (ref 8–17)
AST SERPL-CCNC: 18 U/L (ref 0–32)
BASOPHILS NFR BLD: 0.8 % (ref 0.2–1.2)
BILIRUBIN, TOTAL: 0.28 MG/DL (ref 0–1.2)
BUN/CREAT SERPL: 17.6 (ref 6–20)
CALCIUM SERPL-MCNC: 9.6 MG/DL (ref 8.6–10.2)
CARBON DIOXIDE, CO2: 31 MMOL/L (ref 22–29)
CHLORIDE: 98 MMOL/L (ref 98–107)
CHOLEST SERPL-MSCNC: 147 MG/DL (ref 100–200)
CREAT SERPL-MCNC: 0.8 MG/DL (ref 0.5–0.9)
EOSINOPHIL NFR BLD: 1.3 % (ref 0.7–7)
GFR ESTIMATION: 81.63
GLOBULIN: 2.8 G/DL (ref 1.5–4.5)
GLUCOSE: 80 MG/DL (ref 74–106)
HCT VFR BLD AUTO: 36.7 % (ref 37–47)
HDLC SERPL-MCNC: 53 MG/DL
HGB BLD-MCNC: 10.9 G/DL (ref 12–16)
HIV 1+2 AB+HIV1 P24 AG SERPL QL IA: NONREACTIVE
IMMATURE GRANULOCYTES: 0.5 % (ref 0–0.5)
LDL/HDL RATIO: 1.5 (ref 1–3)
LDLC SERPL CALC-MCNC: 80.6 MG/DL (ref 0–100)
LYMPHOCYTES NFR BLD: 28.8 % (ref 19.3–53.1)
MCH RBC QN AUTO: 22.6 PG (ref 27–32)
MCHC RBC AUTO-ENTMCNC: 29.7 G/DL (ref 32–36)
MCV RBC AUTO: 76 FL (ref 82–100)
MONOCYTES NFR BLD: 8 % (ref 4.7–12.5)
NEUTROPHILS # BLD AUTO: 6.82 X 10 3/UL (ref 2.15–7.56)
NEUTROPHILS NFR BLD: 60.6 %
NUCLEATED RED BLOOD CELLS: 0 /100 WBC (ref 0–0.2)
PLATELET # BLD AUTO: 371 X 10 3/UL (ref 135–400)
POTASSIUM: 4.3 MMOL/L (ref 3.5–5.1)
PROT SNV-MCNC: 6.7 G/DL (ref 6.4–8.3)
RBC # BLD AUTO: 4.83 X 10 6/UL (ref 4.2–5.4)
RDW-SD: 49.1 FL (ref 37–54)
SODIUM: 138 MMOL/L (ref 136–145)
TRIGL SERPL-MCNC: 67 MG/DL (ref 0–150)
TSH SERPL DL<=0.005 MIU/L-ACNC: 1.12 UIU/ML (ref 0.27–4.2)
UREA NITROGEN (BUN): 14.1 MG/DL (ref 6–20)
WBC # BLD: 11.27 X 10 3/UL (ref 4.3–10.8)

## 2021-07-14 RX ORDER — FUROSEMIDE 20 MG/1
20 TABLET ORAL DAILY
Qty: 30 TABLET | Refills: 2 | Status: SHIPPED | OUTPATIENT
Start: 2021-07-14 | End: 2021-10-04 | Stop reason: SDUPTHER

## 2021-07-14 RX ORDER — MULTIVIT-MIN/IRON/FOLIC ACID/K 18-600-40
500 CAPSULE ORAL 2 TIMES DAILY
Qty: 60 CAPSULE | Refills: 3 | Status: SHIPPED | OUTPATIENT
Start: 2021-07-14

## 2021-07-14 RX ORDER — POTASSIUM CHLORIDE 750 MG/1
10 TABLET, EXTENDED RELEASE ORAL DAILY
Qty: 30 TABLET | Refills: 2 | Status: SHIPPED | OUTPATIENT
Start: 2021-07-14 | End: 2021-10-04 | Stop reason: SDUPTHER

## 2021-07-14 RX ORDER — FERROUS SULFATE 324(65)MG
324 TABLET, DELAYED RELEASE (ENTERIC COATED) ORAL 2 TIMES DAILY
Qty: 60 TABLET | Refills: 3 | Status: SHIPPED | OUTPATIENT
Start: 2021-07-14 | End: 2022-01-07 | Stop reason: SDUPTHER

## 2021-08-12 ENCOUNTER — OFFICE VISIT (OUTPATIENT)
Dept: FAMILY MEDICINE | Facility: CLINIC | Age: 36
End: 2021-08-12
Payer: MEDICARE

## 2021-08-12 VITALS
WEIGHT: 293 LBS | HEIGHT: 71 IN | SYSTOLIC BLOOD PRESSURE: 154 MMHG | TEMPERATURE: 98 F | HEART RATE: 86 BPM | DIASTOLIC BLOOD PRESSURE: 94 MMHG | OXYGEN SATURATION: 96 % | BODY MASS INDEX: 41.02 KG/M2

## 2021-08-12 DIAGNOSIS — L03.116 CELLULITIS OF LEFT LOWER EXTREMITY: Primary | ICD-10-CM

## 2021-08-12 DIAGNOSIS — B37.9 YEAST INFECTION: ICD-10-CM

## 2021-08-12 PROCEDURE — 99213 PR OFFICE/OUTPT VISIT, EST, LEVL III, 20-29 MIN: ICD-10-PCS | Mod: S$GLB,,, | Performed by: STUDENT IN AN ORGANIZED HEALTH CARE EDUCATION/TRAINING PROGRAM

## 2021-08-12 PROCEDURE — 99213 OFFICE O/P EST LOW 20 MIN: CPT | Mod: S$GLB,,, | Performed by: STUDENT IN AN ORGANIZED HEALTH CARE EDUCATION/TRAINING PROGRAM

## 2021-08-12 RX ORDER — AMOXICILLIN AND CLAVULANATE POTASSIUM 875; 125 MG/1; MG/1
1 TABLET, FILM COATED ORAL EVERY 12 HOURS
Qty: 14 TABLET | Refills: 0 | Status: SHIPPED | OUTPATIENT
Start: 2021-08-12 | End: 2021-08-16

## 2021-08-12 RX ORDER — FLUCONAZOLE 150 MG/1
150 TABLET ORAL DAILY
Qty: 2 TABLET | Refills: 0 | Status: SHIPPED | OUTPATIENT
Start: 2021-08-12 | End: 2021-08-13

## 2021-08-14 ENCOUNTER — PATIENT MESSAGE (OUTPATIENT)
Dept: FAMILY MEDICINE | Facility: CLINIC | Age: 36
End: 2021-08-14

## 2021-08-16 ENCOUNTER — TELEPHONE (OUTPATIENT)
Dept: FAMILY MEDICINE | Facility: CLINIC | Age: 36
End: 2021-08-16

## 2021-08-16 DIAGNOSIS — L03.116 CELLULITIS OF LEFT LOWER EXTREMITY: Primary | ICD-10-CM

## 2021-08-16 RX ORDER — CLINDAMYCIN HYDROCHLORIDE 300 MG/1
300 CAPSULE ORAL EVERY 8 HOURS
Qty: 21 CAPSULE | Refills: 0 | Status: SHIPPED | OUTPATIENT
Start: 2021-08-16 | End: 2021-08-23

## 2021-08-23 RX ORDER — CELECOXIB 200 MG/1
200 CAPSULE ORAL 2 TIMES DAILY
Qty: 60 CAPSULE | Refills: 2 | Status: SHIPPED | OUTPATIENT
Start: 2021-08-23 | End: 2021-12-01

## 2021-10-04 RX ORDER — FUROSEMIDE 20 MG/1
20 TABLET ORAL DAILY
Qty: 30 TABLET | Refills: 2 | Status: SHIPPED | OUTPATIENT
Start: 2021-10-04 | End: 2021-11-29 | Stop reason: SDUPTHER

## 2021-10-04 RX ORDER — POTASSIUM CHLORIDE 750 MG/1
10 TABLET, EXTENDED RELEASE ORAL DAILY
Qty: 30 TABLET | Refills: 2 | Status: SHIPPED | OUTPATIENT
Start: 2021-10-04 | End: 2021-11-29 | Stop reason: SDUPTHER

## 2021-10-21 ENCOUNTER — PATIENT MESSAGE (OUTPATIENT)
Dept: FAMILY MEDICINE | Facility: CLINIC | Age: 36
End: 2021-10-21
Payer: MEDICARE

## 2021-10-21 RX ORDER — FLUCONAZOLE 150 MG/1
150 TABLET ORAL DAILY
Qty: 1 TABLET | Refills: 0 | Status: SHIPPED | OUTPATIENT
Start: 2021-10-21 | End: 2021-10-22

## 2021-11-26 ENCOUNTER — PATIENT MESSAGE (OUTPATIENT)
Dept: FAMILY MEDICINE | Facility: CLINIC | Age: 36
End: 2021-11-26
Payer: MEDICARE

## 2021-11-29 DIAGNOSIS — J30.2 SEASONAL ALLERGIC RHINITIS, UNSPECIFIED TRIGGER: ICD-10-CM

## 2021-11-29 DIAGNOSIS — I10 HYPERTENSION, UNSPECIFIED TYPE: ICD-10-CM

## 2021-11-29 RX ORDER — PANTOPRAZOLE SODIUM 40 MG/1
40 TABLET, DELAYED RELEASE ORAL DAILY
Qty: 30 TABLET | Refills: 3 | Status: SHIPPED | OUTPATIENT
Start: 2021-11-29

## 2021-11-29 RX ORDER — LEVOCETIRIZINE DIHYDROCHLORIDE 5 MG/1
5 TABLET, FILM COATED ORAL NIGHTLY
Qty: 30 TABLET | Refills: 5 | Status: SHIPPED | OUTPATIENT
Start: 2021-11-29 | End: 2022-11-29

## 2021-11-29 RX ORDER — FUROSEMIDE 20 MG/1
20 TABLET ORAL DAILY
Qty: 30 TABLET | Refills: 3 | Status: SHIPPED | OUTPATIENT
Start: 2021-11-29 | End: 2022-11-29

## 2021-11-29 RX ORDER — METOPROLOL TARTRATE 25 MG/1
25 TABLET, FILM COATED ORAL 2 TIMES DAILY
Qty: 60 TABLET | Refills: 3 | Status: SHIPPED | OUTPATIENT
Start: 2021-11-29 | End: 2022-01-05 | Stop reason: SDUPTHER

## 2021-11-29 RX ORDER — POTASSIUM CHLORIDE 750 MG/1
10 TABLET, EXTENDED RELEASE ORAL DAILY
Qty: 30 TABLET | Refills: 3 | Status: SHIPPED | OUTPATIENT
Start: 2021-11-29

## 2021-12-30 ENCOUNTER — PATIENT MESSAGE (OUTPATIENT)
Dept: FAMILY MEDICINE | Facility: CLINIC | Age: 36
End: 2021-12-30
Payer: MEDICARE

## 2021-12-31 ENCOUNTER — PATIENT MESSAGE (OUTPATIENT)
Dept: FAMILY MEDICINE | Facility: CLINIC | Age: 36
End: 2021-12-31
Payer: MEDICARE

## 2022-01-03 NOTE — TELEPHONE ENCOUNTER
Patient states that her blood pressure have been elevated and she wants to up metoprolol. She was trying to make a virtual appointment, but the system will not let her make one. Blood pressure today is 166/101

## 2022-01-05 ENCOUNTER — OFFICE VISIT (OUTPATIENT)
Dept: FAMILY MEDICINE | Facility: CLINIC | Age: 37
End: 2022-01-05
Payer: MEDICARE

## 2022-01-05 DIAGNOSIS — I10 HYPERTENSION, UNSPECIFIED TYPE: Primary | ICD-10-CM

## 2022-01-05 DIAGNOSIS — R60.9 EDEMA, UNSPECIFIED TYPE: ICD-10-CM

## 2022-01-05 PROCEDURE — 99213 PR OFFICE/OUTPT VISIT, EST, LEVL III, 20-29 MIN: ICD-10-PCS | Mod: 95,,, | Performed by: FAMILY MEDICINE

## 2022-01-05 PROCEDURE — 99213 OFFICE O/P EST LOW 20 MIN: CPT | Mod: 95,,, | Performed by: FAMILY MEDICINE

## 2022-01-05 RX ORDER — NIFEDIPINE 60 MG/1
60 TABLET, EXTENDED RELEASE ORAL DAILY
Qty: 30 TABLET | Refills: 1 | Status: SHIPPED | OUTPATIENT
Start: 2022-01-05 | End: 2022-03-07

## 2022-01-05 RX ORDER — METOPROLOL TARTRATE 50 MG/1
50 TABLET ORAL 2 TIMES DAILY
Qty: 180 TABLET | Refills: 1 | Status: SHIPPED | OUTPATIENT
Start: 2022-01-05 | End: 2023-01-05

## 2022-01-05 NOTE — PROGRESS NOTES
Subjective:      Patient ID: Rosa Maria Burkett is a 36 y.o. female.    Chief Complaint: No chief complaint on file.    The patient location is:  [facility]  The chief complaint leading to consultation is:  Hypertension    Visit type: audiovisual    Face to Face time with patient:  4 minutes  Six minutes of total time spent on the encounter, which includes face to face time and non-face to face time preparing to see the patient (eg, review of tests), Obtaining and/or reviewing separately obtained history, Documenting clinical information in the electronic or other health record, Independently interpreting results (not separately reported) and communicating results to the patient/family/caregiver, or Care coordination (not separately reported).         Each patient to whom he or she provides medical services by telemedicine is:  (1) informed of the relationship between the physician and patient and the respective role of any other health care provider with respect to management of the patient; and (2) notified that he or she may decline to receive medical services by telemedicine and may withdraw from such care at any time.        HPI:  36-year-old white female past history of obesity and hypertension who presents for hypertension.  Blood pressures been running high at home.  She is taking her Lasix for swelling.  She has been taking her metoprolol.  She recently went to the ER.  They told her to increase her metoprolol to 50 mg twice a day.  Has improved slightly.  Blood pressure is 150/95 today.  She took propanolol for blood pressure in the past.  She failed this propanolol.  She denies any recent sexual activity.    Past Medical History:   Diagnosis Date    Acid reflux     Anxiety     Depression     Hypertension     OCD (obsessive compulsive disorder)      Past Surgical History:   Procedure Laterality Date     SECTION       SECTION      TONSILLECTOMY       Family History   Problem Relation Age  of Onset    OCD Mother     Hypertension Mother     Depression Mother     OCD Maternal Grandmother     Depression Maternal Grandmother      Social History     Socioeconomic History    Marital status:    Tobacco Use    Smoking status: Current Every Day Smoker     Types: Vaping with nicotine, Vaping w/o nicotine    Smokeless tobacco: Never Used   Substance and Sexual Activity    Alcohol use: Not Currently    Drug use: Never    Sexual activity: Not Currently     Social Determinants of Health     Financial Resource Strain: Medium Risk    Difficulty of Paying Living Expenses: Somewhat hard   Food Insecurity: No Food Insecurity    Worried About Running Out of Food in the Last Year: Never true    Ran Out of Food in the Last Year: Never true   Transportation Needs: No Transportation Needs    Lack of Transportation (Medical): No    Lack of Transportation (Non-Medical): No   Physical Activity: Inactive    Days of Exercise per Week: 0 days    Minutes of Exercise per Session: 0 min   Stress: Stress Concern Present    Feeling of Stress : Very much   Social Connections: Unknown    Frequency of Communication with Friends and Family: More than three times a week    Frequency of Social Gatherings with Friends and Family: Once a week    Active Member of Clubs or Organizations: No    Attends Club or Organization Meetings: Never    Marital Status:    Housing Stability: Low Risk     Unable to Pay for Housing in the Last Year: No    Number of Places Lived in the Last Year: 1    Unstable Housing in the Last Year: No     Review of patient's allergies indicates:  No Known Allergies    Review of Systems   Respiratory: Negative for shortness of breath.    Cardiovascular: Negative for chest pain and palpitations.   Musculoskeletal: Negative for neck pain.   Neurological: Negative for headaches.       Objective:       There were no vitals taken for this visit.  Physical Exam  Constitutional:        Appearance: She is well-developed and well-nourished.   HENT:      Head: Normocephalic and atraumatic.   Eyes:      Extraocular Movements: EOM normal.      Conjunctiva/sclera: Conjunctivae normal.   Pulmonary:      Effort: Pulmonary effort is normal.   Neurological:      Mental Status: She is alert and oriented to person, place, and time.   Psychiatric:         Mood and Affect: Mood and affect normal.         Behavior: Behavior normal.         Thought Content: Thought content normal.         Judgment: Judgment normal.         Assessment:     1. Hypertension, unspecified type    2. Edema, unspecified type        Plan:   Hypertension, unspecified type  -     metoprolol tartrate (LOPRESSOR) 50 MG tablet; Take 1 tablet (50 mg total) by mouth 2 (two) times daily.  Dispense: 180 tablet; Refill: 1  -     NIFEdipine (PROCARDIA-XL) 60 MG (OSM) 24 hr tablet; Take 1 tablet (60 mg total) by mouth once daily.  Dispense: 30 tablet; Refill: 1    Edema, unspecified type      Increase metoprolol to 50 twice a day.    Add Procardia.  Monitor for swelling.    Continue Lasix daily.    Follow-up in 2 weeks for recheck.  Patient will notifies of her blood pressure.    Medication List with Changes/Refills   New Medications    NIFEDIPINE (PROCARDIA-XL) 60 MG (OSM) 24 HR TABLET    Take 1 tablet (60 mg total) by mouth once daily.   Current Medications    ARIPIPRAZOLE (ABILIFY) 10 MG TAB    Take 10 mg by mouth once daily.    ARIPIPRAZOLE (ABILIFY) 5 MG TAB    Take 5 mg by mouth Daily.    ASCORBIC ACID, VITAMIN C, 500 MG CAP    Take 500 mg by mouth 2 (two) times a day.    ASPIRIN (ECOTRIN) 81 MG EC TABLET    Take 81 mg by mouth once daily.    BUTALBITAL-ACETAMINOPHEN-CAFF -40 MG -40 MG CAP    Take 1 capsule by mouth as needed.    CELECOXIB (CELEBREX) 200 MG CAPSULE    TAKE 1 CAPSULE BY MOUTH TWICE A DAY    CLOTRIMAZOLE (LOTRIMIN) 1 % CREAM    Apply topically 2 (two) times daily.    CYCLOBENZAPRINE (FLEXERIL) 10 MG TABLET    Take 10  mg by mouth as needed.    DIAZEPAM (VALIUM) 5 MG TABLET    Take 5 mg by mouth once daily.    DICLOFENAC SODIUM (VOLTAREN) 1 % GEL    Apply 1 g topically once daily.    FERROUS SULFATE 324 MG (65 MG IRON) TBEC    Take 1 tablet (324 mg total) by mouth 2 (two) times daily.    FLUVOXAMINE (LUVOX) 150 MG 24 HR CAPSULE    Take 300 mg by mouth every morning.    FUROSEMIDE (LASIX) 20 MG TABLET    Take 1 tablet (20 mg total) by mouth once daily.    GABAPENTIN (NEURONTIN) 800 MG TABLET    Take 800 mg by mouth 3 (three) times daily.    LAMOTRIGINE (LAMICTAL) 100 MG TABLET    Take 100 mg by mouth 2 (two) times daily.    LAMOTRIGINE (LAMICTAL) 150 MG TAB    Take 150 mg by mouth once daily.    LEVOCETIRIZINE (XYZAL) 5 MG TABLET    Take 1 tablet (5 mg total) by mouth every evening.    PANTOPRAZOLE (PROTONIX) 40 MG TABLET    Take 1 tablet (40 mg total) by mouth once daily.    POTASSIUM CHLORIDE (KLOR-CON) 10 MEQ TBSR    Take 1 tablet (10 mEq total) by mouth once daily.   Changed and/or Refilled Medications    Modified Medication Previous Medication    METOPROLOL TARTRATE (LOPRESSOR) 50 MG TABLET metoprolol tartrate (LOPRESSOR) 25 MG tablet       Take 1 tablet (50 mg total) by mouth 2 (two) times daily.    Take 1 tablet (25 mg total) by mouth 2 (two) times daily.            Disclaimer: This note may have been prepared using voice recognition software, it may have not been extensively proofed, as such there could be errors within the text such as sound alike errors.     Answers for HPI/ROS submitted by the patient on 1/4/2022  Chronicity: chronic  Onset: more than 1 year ago  Progression since onset: waxing and waning  Condition status: resistant  anxiety: Yes  blurred vision: No  malaise/fatigue: No  orthopnea: No  peripheral edema: No  PND: No  sweats: No  Agents associated with hypertension: no associated agents  CAD risks: obesity, stress  Compliance problems: no compliance problems  Improvement on treatment: mild

## 2022-01-07 ENCOUNTER — PATIENT MESSAGE (OUTPATIENT)
Dept: FAMILY MEDICINE | Facility: CLINIC | Age: 37
End: 2022-01-07
Payer: MEDICARE

## 2022-01-07 RX ORDER — FERROUS SULFATE 324(65)MG
324 TABLET, DELAYED RELEASE (ENTERIC COATED) ORAL 2 TIMES DAILY
Qty: 60 TABLET | Refills: 3 | Status: SHIPPED | OUTPATIENT
Start: 2022-01-07

## 2022-01-24 ENCOUNTER — PATIENT MESSAGE (OUTPATIENT)
Dept: FAMILY MEDICINE | Facility: CLINIC | Age: 37
End: 2022-01-24
Payer: MEDICARE

## 2022-01-25 ENCOUNTER — TELEPHONE (OUTPATIENT)
Dept: FAMILY MEDICINE | Facility: CLINIC | Age: 37
End: 2022-01-25
Payer: MEDICARE

## 2022-01-25 DIAGNOSIS — B37.9 YEAST INFECTION: Primary | ICD-10-CM

## 2022-01-25 RX ORDER — FLUCONAZOLE 150 MG/1
150 TABLET ORAL DAILY
Qty: 1 TABLET | Refills: 0 | Status: SHIPPED | OUTPATIENT
Start: 2022-01-25 | End: 2022-01-26